# Patient Record
Sex: MALE | Race: WHITE
[De-identification: names, ages, dates, MRNs, and addresses within clinical notes are randomized per-mention and may not be internally consistent; named-entity substitution may affect disease eponyms.]

---

## 2019-04-19 ENCOUNTER — HOSPITAL ENCOUNTER (OUTPATIENT)
Age: 69
End: 2019-04-19
Payer: COMMERCIAL

## 2019-04-19 DIAGNOSIS — E78.5: ICD-10-CM

## 2019-04-19 DIAGNOSIS — M10.9: Primary | ICD-10-CM

## 2019-04-19 LAB
ADD MANUAL DIFF / SLIDE REVIEW: NO
ALBUMIN SERPL-MCNC: 4.5 G/DL (ref 3.5–5)
ALBUMIN/GLOB SERPL: 1.6 {RATIO} (ref 1–2.8)
ALP SERPL-CCNC: 63 U/L (ref 38–126)
ALT SERPL-CCNC: 62 IU/L (ref 21–72)
BUN SERPL-MCNC: 16 MG/DL (ref 9–20)
CALCIUM SERPL-MCNC: 9 MG/DL (ref 8.4–10.2)
CHLORIDE SERPL-SCNC: 103 MMOL/L (ref 98–107)
CHOLEST SERPL-MCNC: 189 MG/DL (ref 140–199)
CO2 SERPL-SCNC: 28 MMOL/L (ref 22–32)
ESTIMATED GLOMERULAR FILT RATE: > 60 ML/MIN (ref 60–?)
GLOBULIN SER CALC-MCNC: 2.9 G/DL (ref 1.7–4.1)
GLUCOSE SERPL-MCNC: 81 MG/DL (ref 80–110)
HDLC SERPL-MCNC: 43 MG/DL (ref 40–60)
HEMATOCRIT: 52.3 % (ref 41–53)
HEMOGLOBIN: 17.6 G/DL (ref 13.5–17.5)
HEMOLYSIS: < 15 (ref 0–50)
LYMPHOCYTES # SPEC AUTO: 1100 /UL (ref 1100–4500)
MCV RBC: 94.7 FL (ref 80–100)
MEAN CORPUSCULAR HEMOGLOBIN: 31.9 PG (ref 26–34)
MEAN CORPUSCULAR HGB CONC: 33.6 % (ref 30–36)
PLATELET COUNT: 188 X10^3/UL (ref 150–400)
POTASSIUM SERPL-SCNC: 4.1 MMOL/L (ref 3.4–5.1)
PROT SERPL-MCNC: 7.4 G/DL (ref 6.3–8.2)
SODIUM SERPL-SCNC: 140 MMOL/L (ref 137–145)
TRIGL SERPL-MCNC: 239 MG/DL (ref 35–150)
URATE SERPL-MCNC: 5.7 MG/DL (ref 3.5–8.5)

## 2019-04-19 PROCEDURE — 80061 LIPID PANEL: CPT

## 2019-04-19 PROCEDURE — 84550 ASSAY OF BLOOD/URIC ACID: CPT

## 2019-04-19 PROCEDURE — 85025 COMPLETE CBC W/AUTO DIFF WBC: CPT

## 2019-04-19 PROCEDURE — 36415 COLL VENOUS BLD VENIPUNCTURE: CPT

## 2019-04-19 PROCEDURE — 80053 COMPREHEN METABOLIC PANEL: CPT

## 2019-12-20 ENCOUNTER — HOSPITAL ENCOUNTER
Age: 69
End: 2019-12-20
Payer: COMMERCIAL

## 2019-12-20 DIAGNOSIS — M10.9: Primary | ICD-10-CM

## 2019-12-20 DIAGNOSIS — E55.9: ICD-10-CM

## 2019-12-20 DIAGNOSIS — E78.2: ICD-10-CM

## 2019-12-20 LAB
25(OH)D3+25(OH)D2 SERPL-MCNC: 24.5 NG/ML (ref 30–100)
ADD MANUAL DIFF / SLIDE REVIEW: NO
ALBUMIN SERPL-MCNC: 4.3 G/DL (ref 3.5–5)
ALBUMIN/GLOB SERPL: 1.6 {RATIO} (ref 1–2.8)
ALP SERPL-CCNC: 68 U/L (ref 38–126)
ALT SERPL-CCNC: 56 IU/L (ref ?–50)
BUN SERPL-MCNC: 18 MG/DL (ref 9–20)
CALCIUM SERPL-MCNC: 10.1 MG/DL (ref 8.4–10.2)
CHLORIDE SERPL-SCNC: 101 MMOL/L (ref 98–107)
CHOLEST SERPL-MCNC: 204 MG/DL (ref 140–199)
CO2 SERPL-SCNC: 28 MMOL/L (ref 22–32)
ESTIMATED GLOMERULAR FILT RATE: > 60 ML/MIN (ref 60–?)
GLOBULIN SER CALC-MCNC: 2.7 G/DL (ref 1.7–4.1)
GLUCOSE SERPL-MCNC: 123 MG/DL (ref 80–110)
HDLC SERPL-MCNC: 44 MG/DL (ref 40–60)
HEMATOCRIT: 50.4 % (ref 41–53)
HEMOGLOBIN: 17.4 G/DL (ref 13.5–17.5)
HEMOLYSIS: < 15 (ref 0–50)
LYMPHOCYTES # SPEC AUTO: 1000 /UL (ref 1100–4500)
MCV RBC: 94.6 FL (ref 80–100)
MEAN CORPUSCULAR HEMOGLOBIN: 32.7 PG (ref 26–34)
MEAN CORPUSCULAR HGB CONC: 34.6 % (ref 30–36)
PLATELET COUNT: 198 X10^3/UL (ref 150–400)
POTASSIUM SERPL-SCNC: 4.3 MMOL/L (ref 3.4–5.1)
PROT SERPL-MCNC: 7 G/DL (ref 6.3–8.2)
SODIUM SERPL-SCNC: 139 MMOL/L (ref 137–145)
TRIGL SERPL-MCNC: 422 MG/DL (ref 35–150)
URATE SERPL-MCNC: 5.5 MG/DL (ref 3.5–8.5)

## 2019-12-20 PROCEDURE — 80053 COMPREHEN METABOLIC PANEL: CPT

## 2019-12-20 PROCEDURE — 85025 COMPLETE CBC W/AUTO DIFF WBC: CPT

## 2019-12-20 PROCEDURE — 84550 ASSAY OF BLOOD/URIC ACID: CPT

## 2019-12-20 PROCEDURE — 80061 LIPID PANEL: CPT

## 2019-12-20 PROCEDURE — 82306 VITAMIN D 25 HYDROXY: CPT

## 2020-08-29 ENCOUNTER — HOSPITAL ENCOUNTER (OUTPATIENT)
Age: 70
End: 2020-08-29
Payer: COMMERCIAL

## 2020-08-29 DIAGNOSIS — Z11.59: Primary | ICD-10-CM

## 2020-08-29 PROCEDURE — 87635 SARS-COV-2 COVID-19 AMP PRB: CPT

## 2020-09-01 ENCOUNTER — HOSPITAL ENCOUNTER (OUTPATIENT)
Age: 70
Discharge: HOME | End: 2020-09-01
Payer: COMMERCIAL

## 2020-09-01 VITALS
RESPIRATION RATE: 14 BRPM | TEMPERATURE: 98.4 F | SYSTOLIC BLOOD PRESSURE: 128 MMHG | DIASTOLIC BLOOD PRESSURE: 85 MMHG | HEART RATE: 87 BPM | OXYGEN SATURATION: 93 %

## 2020-09-01 VITALS
HEART RATE: 80 BPM | SYSTOLIC BLOOD PRESSURE: 123 MMHG | DIASTOLIC BLOOD PRESSURE: 78 MMHG | RESPIRATION RATE: 16 BRPM | TEMPERATURE: 98 F | OXYGEN SATURATION: 93 %

## 2020-09-01 VITALS
DIASTOLIC BLOOD PRESSURE: 79 MMHG | SYSTOLIC BLOOD PRESSURE: 128 MMHG | HEART RATE: 88 BPM | RESPIRATION RATE: 16 BRPM | OXYGEN SATURATION: 93 %

## 2020-09-01 VITALS
OXYGEN SATURATION: 93 % | HEART RATE: 81 BPM | DIASTOLIC BLOOD PRESSURE: 85 MMHG | TEMPERATURE: 98.06 F | RESPIRATION RATE: 16 BRPM | SYSTOLIC BLOOD PRESSURE: 132 MMHG

## 2020-09-01 VITALS
TEMPERATURE: 98 F | SYSTOLIC BLOOD PRESSURE: 120 MMHG | DIASTOLIC BLOOD PRESSURE: 73 MMHG | HEART RATE: 84 BPM | OXYGEN SATURATION: 93 % | RESPIRATION RATE: 12 BRPM

## 2020-09-01 VITALS
RESPIRATION RATE: 15 BRPM | OXYGEN SATURATION: 98 % | SYSTOLIC BLOOD PRESSURE: 138 MMHG | TEMPERATURE: 98 F | DIASTOLIC BLOOD PRESSURE: 87 MMHG | HEART RATE: 88 BPM

## 2020-09-01 VITALS — BODY MASS INDEX: 30.1 KG/M2

## 2020-09-01 DIAGNOSIS — D12.0: ICD-10-CM

## 2020-09-01 DIAGNOSIS — Z12.11: Primary | ICD-10-CM

## 2020-09-01 DIAGNOSIS — K64.1: ICD-10-CM

## 2020-09-01 DIAGNOSIS — G47.33: ICD-10-CM

## 2020-09-01 DIAGNOSIS — E78.5: ICD-10-CM

## 2020-09-01 PROCEDURE — 0DJD8ZZ INSPECTION OF LOWER INTESTINAL TRACT, VIA NATURAL OR ARTIFICIAL OPENING ENDOSCOPIC: ICD-10-PCS | Performed by: TRANSPLANT SURGERY

## 2020-09-01 PROCEDURE — 45385 COLONOSCOPY W/LESION REMOVAL: CPT

## 2020-09-01 PROCEDURE — 99153 MOD SED SAME PHYS/QHP EA: CPT

## 2020-09-01 PROCEDURE — 99152 MOD SED SAME PHYS/QHP 5/>YRS: CPT

## 2020-09-01 PROCEDURE — 45380 COLONOSCOPY AND BIOPSY: CPT

## 2020-09-01 NOTE — PM.OP.ENDO
Operative Date/Time/Diagnoses
Date of procedure: 09/01/20
Time of procedure: 14:36
Pre-op diagnosis: Average risk for colon cancer, due for screening colonoscopy 

Post-op diagnosis: other (Multiple adenomatous appearing polyps)

Procedure & Clinicians
Study performed: Colonoscopy
Procedural sedation performed by the endoscopy
Polypectomy with hot snare x1 and cold forceps x3
Same procedure as scheduled: Yes
Indications: Prior colonoscopy 20 years ago, overdue for repeat colonoscopy at this time
Surgeon: Rafaela Perkins
Procedure Notes
SCOAP/Timeout: Performed
Procedure in detail: The patient was brought to the room and placed in left lateral decubitus position with all bony prominences padded.  A time-out was performed and then the patient was given procedural sedation starting with 4 mg of Versed and 
100 mcg of fentanyl.  A total of 6 mg of Versed and 200 micro g of fentanyl were given for the entire procedure.  Vitals were monitored throughout the procedure and remained stable.  Once adequately sedated, the procedure was begun.  A rectal exam 
was performed revealing no abnormalities.  The colonoscope was then introduced to the rectum and advanced to the cecum in the usual fashion.  Colon was quite tortuous and required multiple maneuvers to reach the cecum safely.  The cecum was 
identified by the appendiceal orifice, the mucosal tri-fold, and the ileocecal valve.  The scope was then retracted while rotating side to side and examining each mucosal fold.  Multiple polyps were found throughout the colon.  A small cecal polyp 
was seen and removed near the appendiceal orifice using cold forceps.  A 1 cm polyp was removed from the ascending colon at 90 cm using hot snare.  5 mm polyp was removed from 65 cm with cold forceps.  Another 5 mm polyp was removed from 50 cm with 
cold forceps.   At the conclusion of the procedure retroflexion was performed and small grade 1-2 internal hemorrhoids without stigmata of bleeding were seen.  The scope was then withdrawn from the rectum the procedure was concluded.  The patient 
tolerated the procedure well and was transferred to the PACU in stable condition.
Scope withdrawal time: 15
Sedation minutes: 29
Findings: polyp (For polyps removed.  One from cecum, 90 cm, 65 cm and 50 cm)
Specimen(s): other (Polyps as above)
Complications: none
Impression: Multiple polyps throughout the colon
Post-procedure
Recommendations: Colonscopy in 5 years (Depending on pathology)
Follow up: as needed
Disposition: PACU

## 2020-09-01 NOTE — P.HP_ITS
"History of Present Illness    
History of Present Illness    
Date Patient Seen: 09/01/20    
Time Patient Seen: 14:32    
Chief complaint: SCREENING COLONOSCOPY    
Narrative: This is a 69-year-old man who had 1 prior colonoscopy which he thinks  
was about 20 years ago.  He does not remember in detail the findings, and I do   
not have the note from that procedure, but he believes he was told it was   
 fine. Denies any symptoms of melena, hematochezia, unexplained weight loss,   
unexplained abdominal pain.  He is adopted and does not know his family history   
regarding polyps or colon cancers.  He has a history of obstructive sleep apnea,  
kidney stones, headaches/migraines, gout, hyperlipidemia.    
    
ROS:  Reports headaches/migraines.  Denies nausea.  Thirteen system review is   
otherwise negative other than as mentioned below and in HPI.    
    
PE    
GENERAL: Well groomed and cooperative.  Appears stated age. Answers questions   
promptly and appropriately. Vital signs noted.     
HENT: Normocephalic, atraumatic. Hearing intact.     
EYES: Conjunctiva pink, sclera white, no periorbital swelling.      
CARDIOVASCULAR: Regular rate. No pedal edema.     
RESPIRATORY: Non-tachypneic, breathing comfortably on room air.     
GASTROINTESTINAL: Abdomen soft and non-distended    
GENITALURINARY: No flank tenderness.     
MUSCULOSKELETAL:  Equal tone and mass bilaterally.     
SKIN: Warm, dry, soft, appropriate color for ethnicity.  No other lesions,   
rashes, or wounds.    
 NEURO: Alert and Oriented X 3. No gross sensory deficits, or cognitive issues.     
PSYCH: Appropriate affect and mood.    
     
    
Patient History    
Medical History (Reviewed 09/01/20 @ 14:33 by Rafaela Perkins MD)    
    
Excessive daytime sleepiness (Chronic)    
Gout (Chronic)    
Migraine with aura (Chronic 08/29/11)    
Obstructive sleep apnea (Chronic)    
Snoring (Chronic)    
    
    
Surgical History (Reviewed 09/01/20 @ 14:33 by Rafaela Perkins MD)    
    
Status post appendectomy    
    
    
Family & Social History    
Family History (Reviewed 09/01/20 @ 14:33 by Rafaela Perkins MD)    
Unknown      No problems noted.    
    
    
    
Social History:                             
    
household members                spouse    
    
    
Tobacco & Substance use:                    
    
Smoking Status                   Never smoker    
alcohol intake                   current    
alcohol intake frequency         a few times a week    
Substance Use Type               does not use    
    
    
    
Meds    
Home Medications and Allergies    
                                Home Medications    
    
    
    
 Medication  Instructions  Recorded  Confirmed  Type    
     
allopurinol 150 mg PO DAILY 09/01/20 09/01/20 History    
     
cholecalciferol (vitamin D3) 50 mcg PO DAILY 09/01/20 09/01/20 History    
    
[Vitamin D3]        
     
indomethacin 50 mg PO DAILY 09/01/20 09/01/20 History    
     
rizatriptan 20 mg PO DAILY PRN 09/01/20 09/01/20 History    
    
    
    
                                    Allergies    
    
    
    
Allergy/AdvReac Type Severity Reaction Status Date / Time    
     
No Known Drug Allergies Allergy   Unverified 08/29/19 09:32    
    
    
    
    
Exam    
Vital Signs    
(past 8 hours):                         -    
    
    
    
 09/01/20    
13:35    
     
Temperature 98.0 F    
     
Pulse Rate 88    
     
Respiratory Rate 15    
     
Blood Pressure 138/87    
     
Pulse Oximetry 98    
    
    
                                            
    
    
    
Oxygen Delivery Method         Room Air    
    
    
    
    
    
Assessment & Plan    
Assessment and plan    
(1) At average risk for colon cancer:     
      Status: Acute    
Assessment & Plan narrative: Risks and benefits of screening colonoscopy and   
possible polypectomy were discussed with t
[id number]|2020-09-01 14:36:00|2020-09-01 14:36:00|P.OP.ENDO_ITS|ANA M|Health Information Management|[id number]|"Operative Date/Time/Diagnoses

## 2020-09-01 NOTE — PATH_ITS
[facility] Accession Number: [id number]  
.                                                                01  
Material submitted:                                        .  
PART A: cecum - CECAL POLYP  
PART B: colon - COLON POLYP AT 90CM  
PART C: colon - COLON POLYP AT 65 CM  
PART D: colon -  COLON  POLYP AT 50CM  
.                                                                01  
Clinical history:                                          .  
SCREENING COLONOSCOPY  
.                                                                02  
**********************************************************************  
Diagnosis:  
A. Cecal Polyp:  
     Superficial portion of colorectal mucosa times one with no  
      significant histomorphologic abnormality.  
     Additional levels through the block are noncontributory.  
.  
B.  Colon Polyp at 90 cm:  
     Portions of tubular adenoma x 3.  
.  
C.   Colon polyp at 65 cm:  
     Tubular adenoma.  
.  
D.   Colon polyp at 50 cm:  
     Tubular adenoma.  
[facility]  09/04/2020  1850 Local  
**********************************************************************  
.                                                                02  
Electronically signed:                                     .  
Akua Leone MD, Pathologist  
NPI- [id number]  
.                                                                01  
Gross description:                                         .  
A. Received in formalin and labeled with  cecal polyp  is one fragment of  
   tan soft tissue measuring 0.5 x 0.3 x 0.1 cm. The fragment is entirely  
   submitted in cassette A1.  
B. Received in formalin and labeled with  90 cm-colon polyp  are three  
   pieces of tan soft tissue measuring 0.5 x 0.5 x 0.4 to 0.5 x 0.3 x 0.1  
   cm. The first piece is inked, bisected and entirely submitted in  
   cassette B1. The second piece is inked, bisected and entirely submitted  
   in cassette B2. The third piece is inked and entirely submitted in  
   cassette B2. The total pieces for cassette B2 are three.  
C. Received in formalin and labeled with  colon polyp at 65 cm  is one  
   fragment of tan soft tissue measuring 0.3 x 0.3 x 0.3 cm. The fragment  
   is entirely submitted in cassette C1.  
D. Received in formalin and labeled with  colon polyp at 50 cm  is one  
   fragment of tan soft tissue measuring 0.3 x 0.3 x 0.3 cm. The fragment  
   is entirely submitted in cassette D1.  ([id number])  
/MRV  09/02/2020  1100 Local  
.                                                                02  
Pathologist provided ICD-10:  
K63.5, Z12.11  
.                                                                02  
CPT                                                        .  
548062, 729491, [id number], [id number]  
***Performed at:  01  
   [facility]  
   [address]  [id number]  
   MD Daniel Toweill MD Phone:  [phone]  
***Performed at:  02  
   [facility]  
   [address] [place], [place]  [id number]  
   MD Camila Venegas MD Phone:  [phone]

## 2020-09-01 NOTE — PM.HP.1
History of Present Illness
History of Present Illness
Date Patient Seen: 09/01/20
Time Patient Seen: 14:32
Chief complaint: SCREENING COLONOSCOPY
Narrative: This is a 69-year-old man who had 1 prior colonoscopy which he thinks was about 20 years ago.  He does not remember in detail the findings, and I do not have the note from that procedure, but he believes he was told it was  fine. Denies 
any symptoms of melena, hematochezia, unexplained weight loss, unexplained abdominal pain.  He is adopted and does not know his family history regarding polyps or colon cancers.  He has a history of obstructive sleep apnea, kidney stones, 
headaches/migraines, gout, hyperlipidemia.

ROS:  Reports headaches/migraines.  Denies nausea.  Thirteen system review is otherwise negative other than as mentioned below and in HPI.

PE
GENERAL: Well groomed and cooperative.  Appears stated age. Answers questions promptly and appropriately. Vital signs noted. 
HENT: Normocephalic, atraumatic. Hearing intact. 
EYES: Conjunctiva pink, sclera white, no periorbital swelling.  
CARDIOVASCULAR: Regular rate. No pedal edema. 
RESPIRATORY: Non-tachypneic, breathing comfortably on room air. 
GASTROINTESTINAL: Abdomen soft and non-distended
GENITALURINARY: No flank tenderness. 
MUSCULOSKELETAL:  Equal tone and mass bilaterally. 
SKIN: Warm, dry, soft, appropriate color for ethnicity.  No other lesions, rashes, or wounds.
 NEURO: Alert and Oriented X 3. No gross sensory deficits, or cognitive issues. 
PSYCH: Appropriate affect and mood.
 

Patient History
Medical History (Reviewed 09/01/20 @ 14:33 by Rafaela Perkins MD)

Excessive daytime sleepiness (Chronic)
Gout (Chronic)
Migraine with aura (Chronic 08/29/11)
Obstructive sleep apnea (Chronic)
Snoring (Chronic)


Surgical History (Reviewed 09/01/20 @ 14:33 by Rafaela Perkins MD)

Status post appendectomy


Family & Social History
Family History (Reviewed 09/01/20 @ 14:33 by Rafaela Perkins MD)
Unknown
    No problems noted.



Social History:  

household members              spouse


Tobacco & Substance use:  

Smoking Status                 Never smoker
alcohol intake                 current
alcohol intake frequency       a few times a week
Substance Use Type             does not use



Meds
Home Medications and Allergies
Home Medications

 Medication  Instructions  Recorded  Confirmed  Type
allopurinol 150 mg PO DAILY 09/01/20 09/01/20 History
cholecalciferol (vitamin D3) 50 mcg PO DAILY 09/01/20 09/01/20 History
[Vitamin D3]    
indomethacin 50 mg PO DAILY 09/01/20 09/01/20 History
rizatriptan 20 mg PO DAILY PRN 09/01/20 09/01/20 History


Allergies

Allergy/AdvReac Type Severity Reaction Status Date / Time
No Known Drug Allergies Allergy   Unverified 08/29/19 09:32



Exam
Vital Signs
(past 8 hours):  -

 09/01/20
13:35
Temperature 98.0 F
Pulse Rate 88
Respiratory Rate 15
Blood Pressure 138/87
Pulse Oximetry 98



Oxygen Delivery Method         Room Air




Assessment & Plan
Assessment and plan
(1) At average risk for colon cancer: 
      Status: Acute
Assessment & Plan narrative: Risks and benefits of screening colonoscopy and possible polypectomy were discussed with the patient including risk of bleeding, perforation, need for additional procedures, risks of anesthesia.  The patient desires to 
proceed with the colonoscopy procedure.
COVID-19
COVID-19 status: Negative
Result date/Date tested (Pos, Neg/Pending): 08/29/20
Time Spent With Patient
Time with patient: 15-24 minutes

Quality
VTE
Deep Vein Thrombosis/Pulmonary Embolism Present on Admission: No

## 2021-11-08 ENCOUNTER — HOSPITAL ENCOUNTER (OUTPATIENT)
Age: 71
End: 2021-11-08
Payer: COMMERCIAL

## 2021-11-08 DIAGNOSIS — Z20.822: Primary | ICD-10-CM

## 2021-11-08 DIAGNOSIS — Z01.812: ICD-10-CM

## 2021-11-08 PROCEDURE — 87635 SARS-COV-2 COVID-19 AMP PRB: CPT

## 2021-11-09 ENCOUNTER — HOSPITAL ENCOUNTER (OUTPATIENT)
Age: 71
Discharge: HOME | End: 2021-11-09
Payer: COMMERCIAL

## 2021-11-09 VITALS — BODY MASS INDEX: 30.8 KG/M2

## 2021-11-09 VITALS
DIASTOLIC BLOOD PRESSURE: 77 MMHG | SYSTOLIC BLOOD PRESSURE: 119 MMHG | TEMPERATURE: 97.8 F | RESPIRATION RATE: 16 BRPM | HEART RATE: 67 BPM | OXYGEN SATURATION: 98 %

## 2021-11-09 VITALS
RESPIRATION RATE: 20 BRPM | TEMPERATURE: 98.4 F | HEART RATE: 75 BPM | OXYGEN SATURATION: 96 % | SYSTOLIC BLOOD PRESSURE: 130 MMHG | DIASTOLIC BLOOD PRESSURE: 79 MMHG

## 2021-11-09 DIAGNOSIS — H25.12: Primary | ICD-10-CM

## 2021-11-09 DIAGNOSIS — G47.33: ICD-10-CM

## 2021-11-09 PROCEDURE — 66984 XCAPSL CTRC RMVL W/O ECP: CPT

## 2021-12-06 ENCOUNTER — HOSPITAL ENCOUNTER (OUTPATIENT)
Age: 71
End: 2021-12-06
Payer: COMMERCIAL

## 2021-12-06 DIAGNOSIS — Z20.822: Primary | ICD-10-CM

## 2021-12-06 PROCEDURE — 87635 SARS-COV-2 COVID-19 AMP PRB: CPT

## 2021-12-07 ENCOUNTER — HOSPITAL ENCOUNTER (OUTPATIENT)
Age: 71
Discharge: HOME | End: 2021-12-07
Payer: COMMERCIAL

## 2021-12-07 VITALS
TEMPERATURE: 98.24 F | RESPIRATION RATE: 16 BRPM | HEART RATE: 74 BPM | SYSTOLIC BLOOD PRESSURE: 133 MMHG | DIASTOLIC BLOOD PRESSURE: 80 MMHG | OXYGEN SATURATION: 97 %

## 2021-12-07 VITALS
OXYGEN SATURATION: 97 % | TEMPERATURE: 98.24 F | DIASTOLIC BLOOD PRESSURE: 80 MMHG | HEART RATE: 75 BPM | SYSTOLIC BLOOD PRESSURE: 133 MMHG | RESPIRATION RATE: 16 BRPM

## 2021-12-07 VITALS
OXYGEN SATURATION: 97 % | TEMPERATURE: 98.1 F | SYSTOLIC BLOOD PRESSURE: 129 MMHG | RESPIRATION RATE: 16 BRPM | HEART RATE: 78 BPM | DIASTOLIC BLOOD PRESSURE: 83 MMHG

## 2021-12-07 VITALS — BODY MASS INDEX: 30.8 KG/M2

## 2021-12-07 DIAGNOSIS — H25.11: Primary | ICD-10-CM

## 2021-12-07 DIAGNOSIS — E66.9: ICD-10-CM

## 2021-12-07 DIAGNOSIS — G47.33: ICD-10-CM

## 2021-12-07 DIAGNOSIS — G43.909: ICD-10-CM

## 2021-12-07 PROCEDURE — 66984 XCAPSL CTRC RMVL W/O ECP: CPT

## 2023-04-04 ENCOUNTER — HOSPITAL ENCOUNTER (OUTPATIENT)
Age: 73
End: 2023-04-04
Payer: COMMERCIAL

## 2023-04-04 DIAGNOSIS — U07.1: Primary | ICD-10-CM

## 2023-04-04 LAB
BUN SERPL-MCNC: 15 MG/DL (ref 9–20)
CALCIUM SERPL-MCNC: 9.1 MG/DL (ref 8.4–10.2)
CHLORIDE SERPL-SCNC: 104 MMOL/L (ref 98–107)
CO2 SERPL-SCNC: 30 MMOL/L (ref 22–32)
ESTIMATED GLOMERULAR FILT RATE: > 60 ML/MIN (ref 60–?)
GLUCOSE SERPL-MCNC: 93 MG/DL (ref 80–110)
HEMOLYSIS: 18 (ref 0–50)
POTASSIUM SERPL-SCNC: 4 MMOL/L (ref 3.4–5.1)
SODIUM SERPL-SCNC: 141 MMOL/L (ref 137–145)

## 2023-04-04 PROCEDURE — 36415 COLL VENOUS BLD VENIPUNCTURE: CPT

## 2023-04-04 PROCEDURE — 80048 BASIC METABOLIC PNL TOTAL CA: CPT

## 2023-07-17 ENCOUNTER — HOSPITAL ENCOUNTER (OUTPATIENT)
Age: 73
End: 2023-07-17
Payer: COMMERCIAL

## 2023-07-17 DIAGNOSIS — N40.0: ICD-10-CM

## 2023-07-17 DIAGNOSIS — M10.9: ICD-10-CM

## 2023-07-17 DIAGNOSIS — E78.2: Primary | ICD-10-CM

## 2023-07-17 DIAGNOSIS — R22.42: ICD-10-CM

## 2023-07-17 LAB
ALBUMIN SERPL-MCNC: 4.2 G/DL (ref 3.5–5)
ALBUMIN/GLOB SERPL: 1.4 {RATIO} (ref 1–2.8)
ALP SERPL-CCNC: 72 U/L (ref 38–126)
ALT SERPL-CCNC: 66 IU/L (ref ?–50)
BUN SERPL-MCNC: 15 MG/DL (ref 9–20)
CALCIUM SERPL-MCNC: 9.3 MG/DL (ref 8.4–10.2)
CHLORIDE SERPL-SCNC: 104 MMOL/L (ref 98–107)
CHOLEST SERPL-MCNC: 196 MG/DL (ref 140–199)
CO2 SERPL-SCNC: 27 MMOL/L (ref 22–32)
ESTIMATED GLOMERULAR FILT RATE: > 60 ML/MIN (ref 60–?)
GLOBULIN SER CALC-MCNC: 3.1 G/DL (ref 1.7–4.1)
GLUCOSE SERPL-MCNC: 77 MG/DL (ref 80–110)
HDLC SERPL-MCNC: 45 MG/DL (ref 40–60)
HEMATOCRIT: 48.5 % (ref 41–53)
HEMOGLOBIN: 17 G/DL (ref 13.5–17.5)
HEMOLYSIS: 16 (ref 0–50)
MCV RBC: 93 FL (ref 80–100)
MEAN CORPUSCULAR HEMOGLOBIN: 32.6 PG (ref 26–34)
MEAN CORPUSCULAR HGB CONC: 35 % (ref 30–36)
PLATELET COUNT: 194 X10^3/UL (ref 150–400)
POTASSIUM SERPL-SCNC: 4 MMOL/L (ref 3.4–5.1)
PROT SERPL-MCNC: 7.3 G/DL (ref 6.3–8.2)
PSA SERPL-MCNC: 3.6 NG/ML (ref 0.1–4)
SODIUM SERPL-SCNC: 140 MMOL/L (ref 137–145)
TRIGL SERPL-MCNC: 408 MG/DL (ref 35–150)
TSH W/ REFLEX TO FT4: 0.98 UIU/ML (ref 0.47–4.68)
URATE SERPL-MCNC: 6.4 MG/DL (ref 3.5–8.5)

## 2023-07-17 PROCEDURE — 84443 ASSAY THYROID STIM HORMONE: CPT

## 2023-07-17 PROCEDURE — 80053 COMPREHEN METABOLIC PANEL: CPT

## 2023-07-17 PROCEDURE — 36415 COLL VENOUS BLD VENIPUNCTURE: CPT

## 2023-07-17 PROCEDURE — 80061 LIPID PANEL: CPT

## 2023-07-17 PROCEDURE — 84550 ASSAY OF BLOOD/URIC ACID: CPT

## 2023-07-17 PROCEDURE — 84153 ASSAY OF PSA TOTAL: CPT

## 2023-07-17 PROCEDURE — 85027 COMPLETE CBC AUTOMATED: CPT

## 2023-07-17 PROCEDURE — 73562 X-RAY EXAM OF KNEE 3: CPT

## 2024-09-25 ENCOUNTER — HOSPITAL ENCOUNTER (OUTPATIENT)
Age: 74
End: 2024-09-25
Payer: COMMERCIAL

## 2024-09-25 DIAGNOSIS — N13.8: ICD-10-CM

## 2024-09-25 DIAGNOSIS — M10.9: ICD-10-CM

## 2024-09-25 DIAGNOSIS — E78.2: Primary | ICD-10-CM

## 2024-09-25 DIAGNOSIS — N40.1: ICD-10-CM

## 2024-09-25 DIAGNOSIS — Z20.9: ICD-10-CM

## 2024-09-25 LAB
BUN SERPL-MCNC: 17 MG/DL (ref 9–20)
CALCIUM SERPL-MCNC: 9.6 MG/DL (ref 8.4–10.2)
CHLORIDE SERPL-SCNC: 104 MMOL/L (ref 98–107)
CHOLEST SERPL-MCNC: 132 MG/DL (ref 140–199)
CO2 SERPL-SCNC: 27 MMOL/L (ref 22–32)
ESTIMATED GLOMERULAR FILT RATE: > 60 ML/MIN (ref 60–?)
GLUCOSE SERPL-MCNC: 84 MG/DL (ref 80–110)
HDLC SERPL-MCNC: 51 MG/DL (ref 40–60)
HEMOLYSIS: < 15 (ref 0–50)
POTASSIUM SERPL-SCNC: 4.2 MMOL/L (ref 3.4–5.1)
PSA SERPL-MCNC: 3.93 NG/ML (ref 0.1–4)
SODIUM SERPL-SCNC: 138 MMOL/L (ref 137–145)
TRIGL SERPL-MCNC: 255 MG/DL (ref 35–150)

## 2024-09-25 PROCEDURE — 86803 HEPATITIS C AB TEST: CPT

## 2024-09-25 PROCEDURE — 36415 COLL VENOUS BLD VENIPUNCTURE: CPT

## 2024-09-25 PROCEDURE — 84450 TRANSFERASE (AST) (SGOT): CPT

## 2024-09-25 PROCEDURE — 80048 BASIC METABOLIC PNL TOTAL CA: CPT

## 2024-09-25 PROCEDURE — 80061 LIPID PANEL: CPT

## 2024-09-25 PROCEDURE — 84153 ASSAY OF PSA TOTAL: CPT

## 2024-09-26 LAB — HCV AB SERPL QL IA: NEGATIVE S/C

## 2024-12-22 ENCOUNTER — HOSPITAL ENCOUNTER (EMERGENCY)
Age: 74
LOS: 1 days | Discharge: HOME | End: 2024-12-23
Payer: COMMERCIAL

## 2024-12-22 VITALS
TEMPERATURE: 98 F | RESPIRATION RATE: 18 BRPM | HEART RATE: 66 BPM | SYSTOLIC BLOOD PRESSURE: 175 MMHG | OXYGEN SATURATION: 96 % | DIASTOLIC BLOOD PRESSURE: 95 MMHG

## 2024-12-22 VITALS — BODY MASS INDEX: 30.8 KG/M2

## 2024-12-22 DIAGNOSIS — M54.50: Primary | ICD-10-CM

## 2024-12-22 PROCEDURE — 99283 EMERGENCY DEPT VISIT LOW MDM: CPT

## 2024-12-22 PROCEDURE — 81003 URINALYSIS AUTO W/O SCOPE: CPT

## 2024-12-22 PROCEDURE — 99284 EMERGENCY DEPT VISIT MOD MDM: CPT

## 2024-12-22 PROCEDURE — 96372 THER/PROPH/DIAG INJ SC/IM: CPT

## 2024-12-23 VITALS
RESPIRATION RATE: 16 BRPM | DIASTOLIC BLOOD PRESSURE: 91 MMHG | OXYGEN SATURATION: 96 % | SYSTOLIC BLOOD PRESSURE: 169 MMHG | HEART RATE: 91 BPM

## 2024-12-23 VITALS
RESPIRATION RATE: 18 BRPM | TEMPERATURE: 98.78 F | DIASTOLIC BLOOD PRESSURE: 97 MMHG | HEART RATE: 61 BPM | OXYGEN SATURATION: 100 % | SYSTOLIC BLOOD PRESSURE: 163 MMHG

## 2024-12-23 VITALS
DIASTOLIC BLOOD PRESSURE: 94 MMHG | SYSTOLIC BLOOD PRESSURE: 170 MMHG | RESPIRATION RATE: 18 BRPM | OXYGEN SATURATION: 98 % | HEART RATE: 64 BPM

## 2024-12-23 NOTE — ED.BACK
HPI - Back Pain/Injury
General
Chief Complaint: Back Pain/Injury
Stated Complaint: lower back pain lt side
Time Seen by Provider: 12/23/24 02:23
Source: patient
History of Present Illness
HPI Narrative: 
Patient is a healthy 74-year-old male who presents today with left-sided flank pain.  He reports that he has been doing some heavy lifting and physical activity lately.  He dug it did show a few days ago and then yesterday he moved very heavy piece 
of equipment.  He has been holding it in a position of comfort he notices when he reaches up he has pain certain positions definitely reproduced pain.  He has no numbness or tingling down his leg he has no pain radiating to his abdomen.  No loss of 
urine.  He did take 1 tablet of ibuprofen without any significant relief.
Related Data
Home Medications

 Medication  Instructions  Recorded  Confirmed
cholecalciferol (vitamin D3) 50 50 mcg PO DAILY 09/01/20 09/25/24
mcg (2,000 unit) capsule (Vitamin   
D3)   

Previous Rx's

 Medication  Instructions  Recorded
allopurinol 100 mg tablet 100 mg PO DAILY #90 tabs 09/25/24
rizatriptan 10 mg tablet 20 mg (2 x 10 mg) PO DAILY PRN 09/25/24
 migraines #12 tabs 
rosuvastatin 10 mg tablet 10 mg PO DAILY #90 tabs 09/25/24
methocarbamol 750 mg tablet 750 mg PO Q8H PRN muscle spasm #14 12/23/24
 tabs 


Allergies

Allergy/AdvReac Type Severity Reaction Status Date / Time
wool Allergy Mild Sneezing Verified 09/25/24 09:50



Patient History
Medical History (Reviewed 12/23/24 @ 02:36 by Laura Gaxiola DO)

Obesity (BMI 30.0-34.9)
Migraine without aura
History of colonic polyps
Mixed hyperlipidemia
Adopted
Measles
Chicken pox
Hearing loss (~1972)
Kidney stones (~2013)
Gout
Excessive daytime sleepiness
Snoring
Obstructive sleep apnea (~2020)


Surgical History (Reviewed 12/23/24 @ 02:36 by Laura Gaxiola DO)

Cyst (~1965)
Anesthesia
Ganglion cyst of dorsum of right wrist (~1978)
History of lithotripsy (~2013)
Status post appendectomy (~1958)


Family History (Reviewed 12/23/24 @ 02:36 by Laura Gaxiola DO)
Unknown
    No problems noted.



Social History (Reviewed 12/23/24 @ 02:36 by Laura Gaxiola DO)
details:  , 1 son, retired high school  
household members:  spouse 
Smoking Status:  Never smoker 
alcohol intake:  current 


Smoking Status: Never smoker
alcohol intake frequency: a few times a week

Exam
Initial Vital Signs
Initial Vital Signs: 

Vital Signs

Temperature  98.0 F   12/22/24 23:35
Pulse Rate  66   12/22/24 23:35
Respiratory Rate  18   12/22/24 23:35
Blood Pressure  175/95 H  12/22/24 23:35
Pulse Oximetry  96   12/22/24 23:35
Oxygen Delivery Method  Room Air  12/22/24 23:35


GENERAL:  Alert very pleasant 74-year-old male
CARDIOVASCULAR: peripheral pulses in tact, cap refill <2 sec
RESPIRATORY: No respiratory distress,  speaks in full sentences without difficulty
BACK:  No vertebral tenderness he is tender over on left paraspinal and lumbar area.  It is reproducible to palpation no buttock pain
ABDOMEN:  Soft nontender
EXTREMITIES: Normal range of motion, no clubbing or edema.  Neurovascularly intact
NEUROLOGICAL: Cranial nerves II through XII grossly intact.  Normal gait and speech.
SKIN: Warm, dry, no petechiae, no rashes or lesions.

Course
Orders
Ordered: 




Discontinued Medications

Ketorolac Tromethamine (Ketorolac 30 Mg/Ml Vial)  30 mg IM NOW ONE
 Stop: 12/23/24 02:34
 Last Admin: 12/23/24 02:38  Dose: 30 mg
 Documented By: SALLY
Methocarbamol (Methocarbamol 500 Mg Tablet)  750 mg PO NOW ONE
 Stop: 12/23/24 02:34
 Last Admin: 12/23/24 02:39  Dose: 750 mg
 Documented By: SALLY



Vital Signs
Vital signs: 

Vital Signs - 8 hr

 12/22/24
23:35 12/23/24
00:50 12/23/24
01:33
Temperature 98.0 F  
Pulse Rate 66 64 91 H
Respiratory Rate 18 18 16
Blood Pressure 175/95 H 170/94 H 169/91 H
Pulse Oximetry 96 98 96
Oxygen Delivery Method Room Air Room Air Room Air

 12/23/24
02:41
Temperature 98.7 F
Pulse Rate 61
Respiratory Rate 18
Blood Pressure 163/97 H
Pulse Oximetry 100
Oxygen Delivery Method Room Air




MDM - Back Pain/Injury
Lab Data
Labs: 

Urine Dip

Bedside Urine Glucose          Negative                                          
Bedside Urine Bilirubin        - Negative                                        
Bedside Urine Ketone           - Negative                                        
Urine Specific Gravity         1.01                                              
Bedside Urine Occult Blood     - Negative                                        
Bedside Urine pH               6                                                 
Bedside Urine Protein          - Negative                                        
Bedside Urine Urobilinogen     - Negative                                        
Bedside Urine Nitrite          - Negative                                        
Bedside Urine Leukocytes       - Negative                                        
Esterase                       




MDM Narrative
Medical decision making narrative: 
74-year-old male presenting today with left-sided back pain.  It is reproducible to palpation.  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  I suspect musculoskeletal.  He has no signs or symptoms of cauda equina.  It has not 
consistent with nephrolithiasis.  Also urinalysis does not show any hematuria or other evidence of UTI. 
Patient was given shot of Toradol here in the ED.  Long discussion about other things he can do and take home

Discharge Plan
Departure
Patient Disposition: Home

Clinical Impression:
 Back pain


Instructions:  DI for Back Strain or Sprain

Activity Restrictions/Additional Instructions:
*You have been diagnosed with back pain
*What to do:  At this time increase activity as tolerated no strenuous activity or heavy lifting
Recommend heating pad light stretching, consider massage
May try over-the-counter CVD or arnica cream
*Continue to take medications as directed
Lidocaine patches over-the-counter maybe 1 for 12 hours then remove
Motrin 600 mg every 6 hours for mild-to-moderate pain
Tylenol 1000 mg every 6 hours for mild-to-moderate pain
Methocarbamol 750 mg every 8 hours if needed for muscle spasm
*Follow up with your primary care provider in 2-3 days or call [phone]

*Return to ER if you should have increasing pain leg weakness loss of urine or any new, worsening or concerning symptoms

Prescriptions:
New
  methocarbamol 750 mg tablet 
   750 mg PO Q8H PRN (Reason: muscle spasm) Qty: 14 0RF

No Action
  allopurinol 100 mg tablet 
   100 mg PO DAILY Qty: 90 3RF
  rosuvastatin 10 mg tablet 
   10 mg PO DAILY Qty: 90 3RF
  rizatriptan 10 mg tablet 
   20 mg PO DAILY PRN (Reason: migraines) Qty: 12 2RF
   Rx Instructions:
   may repeat if needed
  cholecalciferol (vitamin D3) [Vitamin D3] 50 mcg (2,000 unit) Capsule 
   50 mcg PO DAILY 

Referrals:
Chaparro Justice MD [Primary Care Provider] - 

Stand Alone Forms:  Patient Portal/API/Survey

## 2024-12-23 NOTE — ED_ITS
HPI - Back Pain/Injury    
General    
Chief Complaint: Back Pain/Injury    
Stated Complaint: lower back pain lt side    
Time Seen by Provider: 12/23/24 02:23    
Source: patient    
History of Present Illness    
HPI Narrative:     
Patient is a healthy 74-year-old male who presents today with left-sided flank   
pain.  He reports that he has been doing some heavy lifting and physical   
activity lately.  He dug it did show a few days ago and then yesterday he moved   
very heavy piece of equipment.  He has been holding it in a position of comfort   
he notices when he reaches up he has pain certain positions definitely   
reproduced pain.  He has no numbness or tingling down his leg he has no pain r  
adiating to his abdomen.  No loss of urine.  He did take 1 tablet of ibuprofen   
without any significant relief.    
Related Data    
                                Home Medications    
    
    
    
 Medication  Instructions  Recorded  Confirmed    
     
cholecalciferol (vitamin D3) 50 50 mcg PO DAILY 09/01/20 09/25/24    
    
mcg (2,000 unit) capsule (Vitamin       
    
D3)       
    
    
                                  Previous Rx's    
    
    
    
 Medication  Instructions  Recorded    
     
allopurinol 100 mg tablet 100 mg PO DAILY #90 tabs 09/25/24    
     
rizatriptan 10 mg tablet 20 mg (2 x 10 mg) PO DAILY PRN 09/25/24    
    
 migraines #12 tabs     
     
rosuvastatin 10 mg tablet 10 mg PO DAILY #90 tabs 09/25/24    
     
methocarbamol 750 mg tablet 750 mg PO Q8H PRN muscle spasm #14 12/23/24    
    
 tabs     
    
    
    
                                    Allergies    
    
    
    
Allergy/AdvReac Type Severity Reaction Status Date / Time    
     
wool Allergy Mild Sneezing Verified 09/25/24 09:50    
    
    
    
    
Patient History    
Medical History (Reviewed 12/23/24 @ 02:36 by Laura Gaxiola DO)    
    
Obesity (BMI 30.0-34.9)    
Migraine without aura    
History of colonic polyps    
Mixed hyperlipidemia    
Adopted    
Measles    
Chicken pox    
Hearing loss (~1972)    
Kidney stones (~2013)    
Gout    
Excessive daytime sleepiness    
Snoring    
Obstructive sleep apnea (~2020)    
    
    
Surgical History (Reviewed 12/23/24 @ 02:36 by Laura Gaxiola DO)    
    
Cyst (~1965)    
Anesthesia    
Ganglion cyst of dorsum of right wrist (~1978)    
History of lithotripsy (~2013)    
Status post appendectomy (~1958)    
    
    
Family History (Reviewed 12/23/24 @ 02:36 by Laura Gaxiola DO)    
Unknown      No problems noted.    
    
    
    
Social History (Reviewed 12/23/24 @ 02:36 by Laura Gaxiola DO)    
details:  , 1 son, retired high school      
household members:  spouse     
Smoking Status:  Never smoker     
alcohol intake:  current     
    
    
Smoking Status: Never smoker    
alcohol intake frequency: a few times a week    
    
Exam    
Initial Vital Signs    
Initial Vital Signs:     
    
Vital Signs    
    
    
    
Temperature  98.0 F   12/22/24 23:35    
     
Pulse Rate  66   12/22/24 23:35    
     
Respiratory Rate  18   12/22/24 23:35    
     
Blood Pressure  175/95 H  12/22/24 23:35    
     
Pulse Oximetry  96   12/22/24 23:35    
     
Oxygen Delivery Method  Room Air  12/22/24 23:35    
    
    
    
GENERAL:  Alert very pleasant 74-year-old male    
CARDIOVASCULAR: peripheral pulses in tact, cap refill <2 sec    
RESPIRATORY: No respiratory distress,  speaks in full sentences without diffi  
culty    
BACK:  No vertebral tenderness he is tender over on left paraspinal and lumbar   
area.  It is reproducible to palpation no buttock pain    
ABDOMEN:  Soft nontender    
EXTREMITIES: Normal range of motion, no clubbing or edema.  Neurovascularly   
intact    
NEUROLOGICAL: Cranial nerves II through XII grossly intact.  Normal gait and   
speech.    
SKIN: Warm, dry, no petechiae, no rashes or lesions.    
    
Course    
Orders    
Ordered:     
    
                                            
    
    
Discontinued Medications    
    
Ketorolac Tromethamine (Ketorolac 30 Mg/Ml Vial)  30 mg IM NOW ONE    
   Stop: 12/23/24 02:34    
   Last Admin: 12/23/24 02:38  Dose: 30 mg    
   Documented By: SALLY    
Methocarbamol (Methocarbamol 500 Mg Tablet)  750 mg PO NOW ONE    
   Stop: 12/23/24 02:34    
   Last Admin: 12/23/24 02:39  Dose: 750 mg    
   Documented By: SALLY    
    
    
    
Vital Signs    
Vital signs:     
    
                               Vital Signs - 8 hr    
    
    
    
 12/22/24    
23:35 12/23/24    
00:50 12/23/24    
01:33    
     
Temperature 98.0 F      
     
Pulse Rate 66 64 91 H    
     
Respiratory Rate 18 18 16    
     
Blood Pressure 175/95 H 170/94 H 169/91 H    
     
Pulse Oximetry 96 98 96    
     
Oxygen Delivery Method Room Air Room Air Room Air    
    
    
    
    
 12/23/24    
02:41    
     
Temperature 98.7 F    
     
Pulse Rate 61    
     
Respiratory Rate 18    
     
Blood Pressure 163/97 H    
     
Pulse Oximetry 100    
     
Oxygen Delivery Method Room Air    
    
    
    
    
    
MDM - Back Pain/Injury    
Lab Data    
Labs:     
    
                                    Urine Dip    
    
    
    
Bedside Urine Glucose          Negative                                           
    
     
Bedside Urine Bilirubin        - Negative                                         
    
     
Bedside Urine Ketone           - Negative                                         
    
     
Urine Specific Gravity         1.01                                               
    
     
Bedside Urine Occult Blood     - Negative                                         
    
     
Bedside Urine pH               6                                                  
    
     
Bedside Urine Protein          - Negative                                         
    
     
Bedside Urine Urobilinogen     - Negative                                         
    
     
Bedside Urine Nitrite          - Negative                                         
    
     
Bedside Urine Leukocytes       - Negative                                         
    
    
Esterase                           
    
    
    
    
    
MDM Narrative    
Medical decision making narrative:     
74-year-old male presenting today with left-sided back pain.  It is reproducible  
to palpation.  He does have history of lifting heavy machinery and physical   
labor.  I suspect musculoskeletal.  He has no signs or symptoms of cauda equina.  
 It has not consistent with nephrolithiasis.  Also urinalysis does not show any   
hematuria or other evidence of UTI.     
Patient was given shot of Toradol here in the ED.  Long discussion about other   
things he can do and take home    
    
Discharge Plan    
Departure    
Patient Disposition: Home    
    
Clinical Impression:    
 Back pain    
    
    
Instructions:  DI for Back Strain or Sprain    
    
Activity Restrictions/Additional Instructions:    
*You have been diagnosed with back pain    
*What to do:  At this time increase activity as tolerated no strenuous activity   
or heavy lifting    
Recommend heating pad light stretching, consider massage    
May try over-the-counter CVD or arnica cream    
*Continue to take medications as directed    
Lidocaine patches over-the-counter maybe 1 for 12 hours then remove    
Motrin 600 mg every 6 hours for mild-to-moderate pain    
Tylenol 1000 mg every 6 hours for mild-to-moderate pain    
Methocarbamol 750 mg every 8 hours if needed for muscle spasm    
*Follow up with your primary care provider in 2-3 days or call [phone]    
    
*Return to ER if you should have increasing pain leg weakness loss of urine or   
any new, worsening or concerning symptoms    
    
Prescriptions:    
New    
  methocarbamol 750 mg tablet     
   750 mg PO Q8H PRN (Reason: muscle spasm) Qty: 14 0RF    
    
No Action    
  allopurinol 100 mg tablet     
   100 mg PO DAILY Qty: 90 3RF    
  rosuvastatin 10 mg tablet     
   10 mg PO DAILY Qty: 90 3RF    
  rizatriptan 10 mg tablet     
   20 mg PO DAILY PRN (Reason: migraines) Qty: 12 2RF    
   Rx Instructions:    
   may repeat if needed    
  cholecalciferol (vitamin D3) [Vitamin D3] 50 mcg (2,000 unit) Capsule     
   50 mcg PO DAILY     
    
Referrals:    
Chaparro Justice MD [Primary Care Provider] -     
    
Stand Alone Forms:  Patient Portal/API/Survey